# Patient Record
Sex: FEMALE | Race: WHITE | Employment: UNEMPLOYED | URBAN - METROPOLITAN AREA
[De-identification: names, ages, dates, MRNs, and addresses within clinical notes are randomized per-mention and may not be internally consistent; named-entity substitution may affect disease eponyms.]

---

## 2017-09-25 RX ORDER — ONDANSETRON 4 MG/1
TABLET, FILM COATED ORAL
Qty: 90 TABLET | Refills: 3 | Status: SHIPPED | OUTPATIENT
Start: 2017-09-25 | End: 2023-04-12 | Stop reason: ALTCHOICE

## 2018-03-20 RX ORDER — ONDANSETRON 4 MG/1
TABLET, FILM COATED ORAL
Qty: 90 TABLET | OUTPATIENT
Start: 2018-03-20

## 2018-05-02 RX ORDER — ONDANSETRON 4 MG/1
TABLET, FILM COATED ORAL
Qty: 90 TABLET | OUTPATIENT
Start: 2018-05-02

## 2018-06-01 ENCOUNTER — HOSPITAL ENCOUNTER (OUTPATIENT)
Facility: HOSPITAL | Age: 53
Discharge: LEFT BEFORE DISCHARGE | End: 2018-06-02
Attending: EMERGENCY MEDICINE | Admitting: HOSPITALIST
Payer: MEDICAID

## 2018-06-01 DIAGNOSIS — R07.9 CHEST PAIN: ICD-10-CM

## 2018-06-01 LAB
ALBUMIN SERPL BCP-MCNC: 3.8 G/DL
ALP SERPL-CCNC: 47 U/L
ALT SERPL W/O P-5'-P-CCNC: 26 U/L
ANION GAP SERPL CALC-SCNC: 9 MMOL/L
AST SERPL-CCNC: 17 U/L
BASOPHILS # BLD AUTO: 0.07 K/UL
BASOPHILS NFR BLD: 0.7 %
BILIRUB SERPL-MCNC: 0.3 MG/DL
BUN SERPL-MCNC: 21 MG/DL
CALCIUM SERPL-MCNC: 9.5 MG/DL
CHLORIDE SERPL-SCNC: 104 MMOL/L
CO2 SERPL-SCNC: 28 MMOL/L
CREAT SERPL-MCNC: 0.73 MG/DL
DIFFERENTIAL METHOD: NORMAL
EOSINOPHIL # BLD AUTO: 0.5 K/UL
EOSINOPHIL NFR BLD: 5.1 %
ERYTHROCYTE [DISTWIDTH] IN BLOOD BY AUTOMATED COUNT: 14.2 %
EST. GFR  (AFRICAN AMERICAN): >60 ML/MIN/1.73 M^2
EST. GFR  (NON AFRICAN AMERICAN): >60 ML/MIN/1.73 M^2
GLUCOSE SERPL-MCNC: 94 MG/DL
HCT VFR BLD AUTO: 44 %
HGB BLD-MCNC: 14.4 G/DL
LYMPHOCYTES # BLD AUTO: 3.7 K/UL
LYMPHOCYTES NFR BLD: 37.1 %
MCH RBC QN AUTO: 29.4 PG
MCHC RBC AUTO-ENTMCNC: 32.7 G/DL
MCV RBC AUTO: 90 FL
MONOCYTES # BLD AUTO: 0.9 K/UL
MONOCYTES NFR BLD: 9 %
NEUTROPHILS # BLD AUTO: 4.8 K/UL
NEUTROPHILS NFR BLD: 47.9 %
NT-PROBNP: 63 PG/ML
PLATELET # BLD AUTO: 207 K/UL
PMV BLD AUTO: 10.5 FL
POTASSIUM SERPL-SCNC: 4.2 MMOL/L
PROT SERPL-MCNC: 7 G/DL
RBC # BLD AUTO: 4.9 M/UL
SODIUM SERPL-SCNC: 141 MMOL/L
TROPONIN I SERPL DL<=0.01 NG/ML-MCNC: <0.012 NG/ML
WBC # BLD AUTO: 9.94 K/UL

## 2018-06-01 PROCEDURE — 94760 N-INVAS EAR/PLS OXIMETRY 1: CPT

## 2018-06-01 PROCEDURE — 63600175 PHARM REV CODE 636 W HCPCS: Performed by: EMERGENCY MEDICINE

## 2018-06-01 PROCEDURE — 93010 ELECTROCARDIOGRAM REPORT: CPT | Mod: ,,, | Performed by: INTERNAL MEDICINE

## 2018-06-01 PROCEDURE — 25500020 PHARM REV CODE 255: Performed by: EMERGENCY MEDICINE

## 2018-06-01 PROCEDURE — 25000003 PHARM REV CODE 250: Performed by: EMERGENCY MEDICINE

## 2018-06-01 PROCEDURE — 85025 COMPLETE CBC W/AUTO DIFF WBC: CPT

## 2018-06-01 PROCEDURE — 96365 THER/PROPH/DIAG IV INF INIT: CPT | Mod: 59

## 2018-06-01 PROCEDURE — 84484 ASSAY OF TROPONIN QUANT: CPT

## 2018-06-01 PROCEDURE — 80053 COMPREHEN METABOLIC PANEL: CPT

## 2018-06-01 PROCEDURE — 83880 ASSAY OF NATRIURETIC PEPTIDE: CPT

## 2018-06-01 PROCEDURE — 93005 ELECTROCARDIOGRAM TRACING: CPT

## 2018-06-01 PROCEDURE — 99285 EMERGENCY DEPT VISIT HI MDM: CPT | Mod: 25

## 2018-06-01 RX ORDER — ASPIRIN 325 MG
325 TABLET ORAL
Status: DISCONTINUED | OUTPATIENT
Start: 2018-06-01 | End: 2018-06-01

## 2018-06-01 RX ORDER — ONDANSETRON 2 MG/ML
4 INJECTION INTRAMUSCULAR; INTRAVENOUS
Status: DISCONTINUED | OUTPATIENT
Start: 2018-06-01 | End: 2018-06-01

## 2018-06-01 RX ORDER — ASPIRIN 325 MG
325 TABLET ORAL
Status: COMPLETED | OUTPATIENT
Start: 2018-06-01 | End: 2018-06-01

## 2018-06-01 RX ADMIN — IOHEXOL 100 ML: 350 INJECTION, SOLUTION INTRAVENOUS at 10:06

## 2018-06-01 RX ADMIN — PROMETHAZINE HYDROCHLORIDE 12.5 MG: 25 INJECTION INTRAMUSCULAR; INTRAVENOUS at 10:06

## 2018-06-01 RX ADMIN — ASPIRIN 325 MG ORAL TABLET 325 MG: 325 PILL ORAL at 09:06

## 2018-06-01 RX ADMIN — NITROGLYCERIN 1 INCH: 20 OINTMENT TOPICAL at 09:06

## 2018-06-01 NOTE — Clinical Note
"Date: 6/2/2018  Patient: Gabrielle Mann  Admitted: 6/1/2018  8:49 PM  Attending Provider: Reynaldo Burton MD    Gabrielle Mann or her authorized caregiver has made the decision for the patient to leave the emergency department against the advice of Dr. Violette Burton. She or her authorized caregiver has been informed and understands the inherent risks, including death.  She or her authorized caregiver has decided to accept the responsibility for this decision. Gabrielle Mann and all necessary parties have b een advised that she may return for further evaluation or treatment. Her condition at time of discharge was Stable.  Gabrielle Mann had current vital signs as follows:  BP (!) 156/65 (BP Location: Left arm, Patient Position: Lying)   Pulse (!) 58   Temp  98 °F (36.7 °C) (Oral)   Resp 19   Ht 5' 1" (1.549 m)   "

## 2018-06-02 VITALS
HEIGHT: 61 IN | OXYGEN SATURATION: 98 % | SYSTOLIC BLOOD PRESSURE: 156 MMHG | HEART RATE: 58 BPM | TEMPERATURE: 98 F | RESPIRATION RATE: 19 BRPM | DIASTOLIC BLOOD PRESSURE: 65 MMHG

## 2018-06-02 PROBLEM — R07.9 CHEST PAIN: Status: ACTIVE | Noted: 2018-06-02

## 2018-06-02 LAB — TROPONIN I SERPL DL<=0.01 NG/ML-MCNC: <0.012 NG/ML

## 2018-06-02 PROCEDURE — 84484 ASSAY OF TROPONIN QUANT: CPT

## 2018-06-02 RX ORDER — ESCITALOPRAM OXALATE 20 MG/1
20 TABLET ORAL DAILY
COMMUNITY
End: 2022-08-26

## 2018-06-02 RX ORDER — PANTOPRAZOLE SODIUM 40 MG/1
40 TABLET, DELAYED RELEASE ORAL DAILY
COMMUNITY

## 2018-06-02 RX ORDER — LISINOPRIL AND HYDROCHLOROTHIAZIDE 12.5; 2 MG/1; MG/1
1 TABLET ORAL DAILY
COMMUNITY

## 2018-06-02 RX ORDER — ASPIRIN 81 MG/1
81 TABLET ORAL DAILY
COMMUNITY

## 2018-06-02 RX ORDER — ATORVASTATIN CALCIUM 40 MG/1
40 TABLET, FILM COATED ORAL DAILY
COMMUNITY

## 2018-06-02 RX ORDER — ASPIRIN 325 MG
325 TABLET ORAL DAILY
Status: CANCELLED | OUTPATIENT
Start: 2018-06-02

## 2018-06-02 RX ORDER — METOPROLOL SUCCINATE 50 MG/1
50 TABLET, EXTENDED RELEASE ORAL DAILY
COMMUNITY

## 2018-06-02 RX ORDER — PANTOPRAZOLE SODIUM 40 MG/10ML
40 INJECTION, POWDER, LYOPHILIZED, FOR SOLUTION INTRAVENOUS DAILY
Status: CANCELLED | OUTPATIENT
Start: 2018-06-02

## 2018-06-02 RX ORDER — ONDANSETRON 2 MG/ML
4 INJECTION INTRAMUSCULAR; INTRAVENOUS EVERY 8 HOURS PRN
Status: CANCELLED | OUTPATIENT
Start: 2018-06-02

## 2018-06-02 RX ORDER — METFORMIN HYDROCHLORIDE 500 MG/1
500 TABLET ORAL 2 TIMES DAILY WITH MEALS
COMMUNITY

## 2018-06-02 NOTE — ED PROVIDER NOTES
Encounter Date: 6/1/2018       History     Chief Complaint   Patient presents with    Chest Pain     Chest pain started 10 min PTA. Midsternal Chest pain that radiates to left arm. Patient complaining of SOB and Nausea. No vomiting and diaphoresis noted.      Patient is a 53-year-old woman comes he also complained of some nausea but denies diaphoresis.  Pain is 10/10 and described as a tightness. No SOB. No trauma, no cough.           Review of patient's allergies indicates:   Allergen Reactions    Inderal [propranolol]     Morphine      Past Medical History:   Diagnosis Date    COPD (chronic obstructive pulmonary disease)     Diabetes mellitus     Emphysema lung     Hypertension     PVC (premature ventricular contraction)      Past Surgical History:   Procedure Laterality Date    APPENDECTOMY      CHOLECYSTECTOMY      COMPUTED TOMOGRAPHY ANGIOGRAM      TONSILLECTOMY       No family history on file.  Social History   Substance Use Topics    Smoking status: Current Every Day Smoker     Packs/day: 1.00     Types: Cigarettes    Smokeless tobacco: Never Used    Alcohol use No     Review of Systems   Constitutional: Negative for fever.   HENT: Negative for sore throat.    Respiratory: Positive for chest tightness. Negative for shortness of breath.    Cardiovascular: Positive for chest pain.   Gastrointestinal: Negative for abdominal pain and nausea.   Genitourinary: Negative for dysuria.   Musculoskeletal: Negative for back pain and neck stiffness.   Skin: Negative for rash.   Neurological: Negative for weakness.   Hematological: Does not bruise/bleed easily.   All other systems reviewed and are negative.      Physical Exam     Initial Vitals [06/01/18 2052]   BP Pulse Resp Temp SpO2   127/63 65 20 98.1 °F (36.7 °C) 95 %      MAP       84.33         Physical Exam    Nursing note and vitals reviewed.  Constitutional: Vital signs are normal. She appears well-developed and well-nourished. She is not  diaphoretic. No distress.   HENT:   Head: Normocephalic and atraumatic.   Eyes: Conjunctivae and EOM are normal. Pupils are equal, round, and reactive to light.   Neck: Normal range of motion. Neck supple.   Cardiovascular: Normal rate, regular rhythm and normal heart sounds.   Pulmonary/Chest: Breath sounds normal. No respiratory distress. She has no wheezes. She has no rhonchi. She has no rales.   Abdominal: Soft. She exhibits no distension. There is tenderness. There is no rebound and no guarding.   Musculoskeletal: Normal range of motion. She exhibits no edema or tenderness.   Neurological: She is alert and oriented to person, place, and time.   Skin: Skin is warm and dry.   Psychiatric: She has a normal mood and affect.         ED Course   Procedures  Labs Reviewed   COMPREHENSIVE METABOLIC PANEL - Abnormal; Notable for the following:        Result Value    BUN, Bld 21 (*)     All other components within normal limits   CBC W/ AUTO DIFFERENTIAL   TROPONIN I   NT-PRO NATRIURETIC PEPTIDE   TROPONIN I                                  Clinical Impression:   The encounter diagnosis was Chest pain.    CT Abdomen Pelvis With Contrast   Final Result      1.  No acute abnormality identified in the abdomen or pelvis.      2.  Stable appearance of the fat containing umbilical hernia.      All CT scans at this facility use dose modulation, iterative reconstruction and/or weight based dosing when appropriate to reduce radiation dose to as low as reasonably achievable.         Electronically signed by: Valentin Irizarry MD   Date:    06/01/2018   Time:    23:13      X-Ray Chest AP Portable   Final Result      1. The lungs are clear.   2. There are several oval-shaped calcifications projected lateral to the right humeral head. One of the larger ones measures 5 mm.  This is characteristic of calcific tendinitis.   .         Electronically signed by: Uriel Holliday MD   Date:    06/01/2018   Time:    21:32        Labs Reviewed    COMPREHENSIVE METABOLIC PANEL - Abnormal; Notable for the following:        Result Value    BUN, Bld 21 (*)     All other components within normal limits   CBC W/ AUTO DIFFERENTIAL   TROPONIN I   NT-PRO NATRIURETIC PEPTIDE   TROPONIN I     Imaging Results          CT Abdomen Pelvis With Contrast (Final result)  Result time 06/01/18 23:13:35    Final result by Valentin Irizarry MD (06/01/18 23:13:35)                 Impression:      1.  No acute abnormality identified in the abdomen or pelvis.    2.  Stable appearance of the fat containing umbilical hernia.    All CT scans at this facility use dose modulation, iterative reconstruction and/or weight based dosing when appropriate to reduce radiation dose to as low as reasonably achievable.      Electronically signed by: Valentin Irizarry MD  Date:    06/01/2018  Time:    23:13             Narrative:    EXAMINATION:  CT ABDOMEN PELVIS WITH CONTRAST    CLINICAL HISTORY:  Abdominal pain, unspecified;    TECHNIQUE:  Axial CT imaging was performed through the abdomen and pelvis with  75cc  of intravenous contrast. Multiplanar reformats were performed and interpreted.    COMPARISON:  CT abdomen and pelvis, 03/05/2017.    FINDINGS:  Lung bases are clear.    The liver, spleen, kidneys, adrenal glands, and pancreas are normal.    Cholecystectomy clips are noted.  There is a umbilical hernia containing only fat.  No herniated bowel loops.    No free fluid, free air, or inflammatory change.    Normal appendix.  No bowel obstruction.  There is sigmoid colon diverticulosis but no evidence for diverticulitis.    The abdominal aorta is normal in caliber.    The urinary bladder is unremarkable. BTL clips are noted.  The uterus and adnexa are unremarkable.    No significant osseous abnormality is identified.                               X-Ray Chest AP Portable (Final result)  Result time 06/01/18 21:32:44    Final result by PRIYA Holliday Sr., MD (06/01/18 21:32:44)                  Impression:      1. The lungs are clear.  2. There are several oval-shaped calcifications projected lateral to the right humeral head. One of the larger ones measures 5 mm.  This is characteristic of calcific tendinitis.  .      Electronically signed by: Uriel Holliday MD  Date:    06/01/2018  Time:    21:32             Narrative:    EXAMINATION:  XR CHEST AP PORTABLE    CLINICAL HISTORY:  Chest Pain;    COMPARISON:  None    FINDINGS:  The size of the heart is normal. The lungs are clear. There is no pneumothorax.  The costophrenic angles are sharp.  There are several oval-shaped calcifications projected lateral to the right humeral head.  One of the larger ones measures 5 mm.                                Disposition:   Disposition: Placed in Observation  Condition: Stable                        Reynaldo Burton MD  06/02/18 0029

## 2018-06-02 NOTE — DISCHARGE INSTRUCTIONS
Although you are leaving against medical advice please return for any concerns or if your wish to complete your workup. Please follow up with your cardiologist as soon as possible

## 2018-06-02 NOTE — ED NOTES
Patient resting quietly with eyes closed in bed with no complaints of chest pain. Even chest rise and fall noted.

## 2023-02-28 ENCOUNTER — HOSPITAL ENCOUNTER (OUTPATIENT)
Dept: RADIOLOGY | Facility: HOSPITAL | Age: 58
Discharge: HOME | End: 2023-02-28
Attending: STUDENT IN AN ORGANIZED HEALTH CARE EDUCATION/TRAINING PROGRAM
Payer: MEDICAID

## 2023-02-28 ENCOUNTER — OFFICE VISIT (OUTPATIENT)
Dept: FAMILY MEDICINE | Facility: HOSPITAL | Age: 58
End: 2023-02-28
Payer: MEDICAID

## 2023-02-28 VITALS
WEIGHT: 254.88 LBS | BODY MASS INDEX: 48.12 KG/M2 | HEART RATE: 63 BPM | SYSTOLIC BLOOD PRESSURE: 133 MMHG | DIASTOLIC BLOOD PRESSURE: 78 MMHG | HEIGHT: 61 IN

## 2023-02-28 DIAGNOSIS — N95.2 ATROPHIC VAGINITIS: ICD-10-CM

## 2023-02-28 DIAGNOSIS — M25.50 ARTHRALGIA, UNSPECIFIED JOINT: ICD-10-CM

## 2023-02-28 DIAGNOSIS — N95.0 POST-MENOPAUSAL BLEEDING: Primary | ICD-10-CM

## 2023-02-28 DIAGNOSIS — Z72.0 TOBACCO USE: ICD-10-CM

## 2023-02-28 DIAGNOSIS — E11.9 TYPE 2 DIABETES MELLITUS WITHOUT COMPLICATION, WITHOUT LONG-TERM CURRENT USE OF INSULIN: ICD-10-CM

## 2023-02-28 PROBLEM — R07.9 CHEST PAIN: Status: RESOLVED | Noted: 2018-06-02 | Resolved: 2023-02-28

## 2023-02-28 PROBLEM — E78.5 DYSLIPIDEMIA: Status: ACTIVE | Noted: 2023-02-14

## 2023-02-28 PROBLEM — R11.15 NON-INTRACTABLE CYCLICAL VOMITING WITH NAUSEA: Status: ACTIVE | Noted: 2018-08-28

## 2023-02-28 PROBLEM — G89.29 CHRONIC PAIN: Status: ACTIVE | Noted: 2020-05-19

## 2023-02-28 PROBLEM — F17.210 CIGARETTE SMOKER: Status: RESOLVED | Noted: 2017-07-28 | Resolved: 2023-02-28

## 2023-02-28 PROBLEM — R11.15 NON-INTRACTABLE CYCLICAL VOMITING WITH NAUSEA: Status: RESOLVED | Noted: 2018-08-28 | Resolved: 2023-02-28

## 2023-02-28 PROBLEM — F17.210 CIGARETTE SMOKER: Status: ACTIVE | Noted: 2017-07-28

## 2023-02-28 PROBLEM — G56.93 NEUROPATHY OF BOTH UPPER EXTREMITIES: Status: ACTIVE | Noted: 2020-05-19

## 2023-02-28 PROCEDURE — 73502 X-RAY EXAM HIP UNI 2-3 VIEWS: CPT | Mod: TC,FY,RT

## 2023-02-28 PROCEDURE — 73562 X-RAY EXAM OF KNEE 3: CPT | Mod: 26,50,, | Performed by: RADIOLOGY

## 2023-02-28 PROCEDURE — 73502 XR HIP WITH PELVIS WHEN PERFORMED, 2 OR 3  VIEWS RIGHT: ICD-10-PCS | Mod: 26,RT,, | Performed by: RADIOLOGY

## 2023-02-28 PROCEDURE — 73562 XR KNEE 3 VIEW BILATERAL: ICD-10-PCS | Mod: 26,50,, | Performed by: RADIOLOGY

## 2023-02-28 PROCEDURE — 73502 X-RAY EXAM HIP UNI 2-3 VIEWS: CPT | Mod: 26,RT,, | Performed by: RADIOLOGY

## 2023-02-28 PROCEDURE — 73562 X-RAY EXAM OF KNEE 3: CPT | Mod: TC,50,FY

## 2023-02-28 PROCEDURE — 99215 OFFICE O/P EST HI 40 MIN: CPT | Performed by: STUDENT IN AN ORGANIZED HEALTH CARE EDUCATION/TRAINING PROGRAM

## 2023-02-28 RX ORDER — TRAMADOL HYDROCHLORIDE 50 MG/1
1 TABLET ORAL
COMMUNITY
End: 2023-04-12

## 2023-02-28 RX ORDER — ALBUTEROL SULFATE 90 UG/1
AEROSOL, METERED RESPIRATORY (INHALATION)
COMMUNITY
Start: 2023-02-13

## 2023-02-28 RX ORDER — PREGABALIN 75 MG/1
75 CAPSULE ORAL 2 TIMES DAILY
Qty: 60 CAPSULE | Refills: 3 | Status: SHIPPED | OUTPATIENT
Start: 2023-02-28 | End: 2023-04-12 | Stop reason: ALTCHOICE

## 2023-02-28 RX ORDER — ACETAMINOPHEN 500 MG
2 TABLET ORAL
COMMUNITY

## 2023-02-28 RX ORDER — METOPROLOL TARTRATE 50 MG/1
TABLET ORAL
COMMUNITY
Start: 2023-01-24

## 2023-02-28 RX ORDER — BUDESONIDE AND FORMOTEROL FUMARATE DIHYDRATE 160; 4.5 UG/1; UG/1
AEROSOL RESPIRATORY (INHALATION)
COMMUNITY
Start: 2022-10-06

## 2023-02-28 RX ORDER — PRAVASTATIN SODIUM 20 MG/1
1 TABLET ORAL
COMMUNITY

## 2023-02-28 NOTE — PROGRESS NOTES
History & Physical  [facility]      SUBJECTIVE:     History of Present Illness:  Patient is a 57 y.o. female presents to clinic to establish care. PMH HTN, HLD, T2DM, COPD, pseudogout.     Post-menopausal bleeding: Pt has had increased urinary frequency and pelvic cramping with some blood she believes was vaginal over the last 2 days. Reports feeling like she is having a period but not having the bleeding associated. LMP 4 years ago, last pap at that time was normal.     Joint paint: Pt with history of pseudogout in left wrist. Over the last year has had more diffuse joint pain and stiffness in the shoulders, hips and knees. Unsure if this is pseudogout spreading or other etiology. Family history of brother with diabetes insipidus in childhood. Pain somewhat improves with movement but struggles to move due to pain and stiffness. Celexa hasn't helped. Previously did not tolerate gabapentin.     Last mamogram- within the last year  Immunizations UTD  Last colonoscopy- 3-4 years ago and negative    Smoker- Quit smoking cigarettes in . Currently vaping and working on [employer] back. Has noticed weight gain since stopping cigarettes.       Review of patient's allergies indicates:   Allergen Reactions    Inderal [propranolol]     Morphine        Past Medical History:   Diagnosis Date    COPD (chronic obstructive pulmonary disease)     Diabetes mellitus     Emphysema lung     Hypertension     PVC (premature ventricular contraction)      Past Surgical History:   Procedure Laterality Date    APPENDECTOMY      CHOLECYSTECTOMY      COMPUTED TOMOGRAPHY ANGIOGRAM      TONSILLECTOMY       No family history on file.  Social History     Tobacco Use    Smoking status: Every Day     Packs/day: 0.00     Years: 41.00     Pack years: 0.00     Types: Vaping with nicotine, Cigarettes     Start date:      Last attempt to quit: 2022     Years since quittin.3    Smokeless tobacco: Never    Tobacco  "comments:     Trying to quit now vaping    Substance Use Topics    Alcohol use: No    Drug use: Yes     Types: Marijuana        OBJECTIVE:     Vital Signs (Most Recent)  Vitals:    02/28/23 0852   BP: 133/78   Pulse: 63   Weight: 115.6 kg (254 lb 13.6 oz)   Height: 5' 1" (1.549 m)     BMI: 48.51    Physical Exam:  Gen: Well nourished and developed, NAD, appears stated age  Neck: trachea midline, no LAD, no thyromegaly  CV: RRR, S1 and S2 present, no murmurs, no LE edema  Resp: breathing comfortably on room air, CTAB, no wheezes or crackles  Abd: Non-distended, BSx4, non-tender  Skin: No rashes or abnormal skin lesions, no apparent jaundice or bruising  Neuro: A&Ox4, sensation intact throughout, spontaneous movements, gait smooth yony wnl  Psych: Logical thought process, answers questions appropriately    GYN: External genitalia wnl, no apparent masses or lesions, bimanual examination performed and tolerated by the patient, no cervical motion tenderness. Speculum examination performed and tolerated by the patient with some discomfort. Cervical os closed and without obvious lesions. No abnormal vaginal discharge or bleeding appreciated. Significant vaginal mucosa atrophy. Pap test obtained, pt tolerated procedure well. STI vaginal swabs obtained, pt tolerated procedure well.     Pelvic examination performed by Julian Kemp DO in the presence of a chaperone.     POCT UA: wnl    ASSESSMENT/PLAN:   57 y.o.female presents to clinic to establish care.     1. Post-menopausal bleeding  Atrophic vaginitis likely source of urinary symptoms and vaginal discomfort. No signs of bleeding from urethra, vagina or rectum at this time. Need to continue work-up of post-menopausal bleeding and assess need for further work-up with EMB pending results. Pap and STI screening obtained. Pt verbalized understanding and was in agreement with the plan.    - TSH; Future  - CBC Auto Differential; Future  - Follicle Stimulating Hormone; " Future  - US Transvaginal Non OB; Future  - Prolactin; Future  - Sedimentation rate; Future  - C-reactive protein; Future  - TAL; Future    2. Arthralgia, unspecified joint  Arthritis worsened with recent weight gain vs autoimmune process. Will evaluate further. Will trial lyrica for pain. Medication, side effects and proper use discussed. Pt verbalized understanding and was in agreement with the plan.    - CBC Auto Differential; Future  - Comprehensive Metabolic Panel; Future  - Rheumatoid factor; Future  - Cyclic citrul peptide antibody, IgG; Future  - Sjogrens syndrome-A extractable nuclear antibody; Future  - Hepatitis C antibody; Future  - pregabalin (LYRICA) 75 MG capsule; Take 1 capsule (75 mg total) by mouth 2 (two) times daily.  Dispense: 60 capsule; Refill: 3  - X-Ray Knee 3 View Bilateral; Future  - X-Ray Hip 2 or 3 views Right (with Pelvis when performed); Future    3. Type 2 diabetes mellitus without complication, without long-term current use of insulin  Due for HgbA1C screening. Pending results will plan to start ozempic for weight loss and glycemic control. Medication, side effects and proper use discussed. Pt verbalized understanding and was in agreement with the plan.    - Hemoglobin A1C; Future  - pregabalin (LYRICA) 75 MG capsule; Take 1 capsule (75 mg total) by mouth 2 (two) times daily.  Dispense: 60 capsule; Refill: 3    4. Atrophic vaginitis  As above. Medication, side effects and proper use discussed. Pt verbalized understanding and was in agreement with the plan.    - conjugated estrogens (PREMARIN) vaginal cream; Place 0.5 g vaginally twice a week.  Dispense: 30 g; Refill: 3    5. Tobacco use  Vaping cessation discussed. Pt currently working on cutting back.     Follow-up: 1 month for lyrica dosing and further evaluation of post-menopausal bleeding    A total of 45 minutes was spent on patient care during this encounter which included chart review, examining the patient, formulating a  treatment plan and documentation.     Complex medical decision making performed due to multiple medical problems addressed during the visit.     Julian Kemp DO  [facility], PGY-3

## 2023-03-06 ENCOUNTER — TELEPHONE (OUTPATIENT)
Dept: FAMILY MEDICINE | Facility: HOSPITAL | Age: 58
End: 2023-03-06
Payer: MEDICAID

## 2023-03-06 DIAGNOSIS — M11.89 PSEUDOGOUT INVOLVING MULTIPLE JOINTS: Primary | ICD-10-CM

## 2023-03-06 DIAGNOSIS — E11.9 TYPE 2 DIABETES MELLITUS WITHOUT COMPLICATION, WITHOUT LONG-TERM CURRENT USE OF INSULIN: ICD-10-CM

## 2023-03-06 RX ORDER — SEMAGLUTIDE 1.34 MG/ML
0.25 INJECTION, SOLUTION SUBCUTANEOUS
Qty: 1 PEN | Refills: 5 | Status: SHIPPED | OUTPATIENT
Start: 2023-03-06 | End: 2023-04-12 | Stop reason: ALTCHOICE

## 2023-03-06 RX ORDER — COLCHICINE 0.6 MG/1
0.6 TABLET ORAL 2 TIMES DAILY
Qty: 60 TABLET | Refills: 11 | Status: SHIPPED | OUTPATIENT
Start: 2023-03-06 | End: 2024-03-05

## 2023-03-06 NOTE — TELEPHONE ENCOUNTER
Called to discuss lab and pap smear results.     Pap smear: nilm, HPV negative, negative for BV/Trich/yeast/GCCT    Autoimmune work-up with only positive of TAL 1:160 homogenous. All other tests negative. This is consistent with pseudogout. X-ray hip and pelvis with hydroxyapatite deposition. This can be related to pseudogout or cancerous process given overall blood work not consistent with other hypercalcemia states. Need to continue work-up for post-menopausal bleeding but if unremarkable all symptoms likely related to pseudogout. Will refer to rheumatology given number of joints involved and start colchicine BID. Medication, side effects and proper use discussed. Pt verbalized understanding and was in agreement with the plan.      Pt has T2DM and is interested in weight loss. Will start ozempic. Medication, side effects and proper use discussed. Pt verbalized understanding and was in agreement with the plan.      Julian Kemp,   \[facility]\"" Family Medicine, PGY-3

## 2023-03-31 ENCOUNTER — PATIENT MESSAGE (OUTPATIENT)
Dept: FAMILY MEDICINE | Facility: HOSPITAL | Age: 58
End: 2023-03-31
Payer: MEDICAID

## 2023-04-03 ENCOUNTER — PATIENT MESSAGE (OUTPATIENT)
Dept: FAMILY MEDICINE | Facility: HOSPITAL | Age: 58
End: 2023-04-03
Payer: MEDICAID

## 2023-04-04 ENCOUNTER — TELEPHONE (OUTPATIENT)
Dept: FAMILY MEDICINE | Facility: HOSPITAL | Age: 58
End: 2023-04-04
Payer: MEDICAID

## 2023-04-12 ENCOUNTER — OFFICE VISIT (OUTPATIENT)
Dept: FAMILY MEDICINE | Facility: HOSPITAL | Age: 58
End: 2023-04-12
Payer: MEDICAID

## 2023-04-12 VITALS
SYSTOLIC BLOOD PRESSURE: 151 MMHG | HEART RATE: 64 BPM | HEIGHT: 61 IN | BODY MASS INDEX: 48.12 KG/M2 | DIASTOLIC BLOOD PRESSURE: 73 MMHG | WEIGHT: 254.88 LBS

## 2023-04-12 DIAGNOSIS — M25.50 ARTHRALGIA, UNSPECIFIED JOINT: ICD-10-CM

## 2023-04-12 DIAGNOSIS — N95.0 POST-MENOPAUSAL BLEEDING: ICD-10-CM

## 2023-04-12 DIAGNOSIS — E11.9 TYPE 2 DIABETES MELLITUS WITHOUT COMPLICATION, WITHOUT LONG-TERM CURRENT USE OF INSULIN: Primary | ICD-10-CM

## 2023-04-12 DIAGNOSIS — K21.9 GASTROESOPHAGEAL REFLUX DISEASE WITHOUT ESOPHAGITIS: ICD-10-CM

## 2023-04-12 PROCEDURE — 99215 OFFICE O/P EST HI 40 MIN: CPT | Performed by: STUDENT IN AN ORGANIZED HEALTH CARE EDUCATION/TRAINING PROGRAM

## 2023-04-12 RX ORDER — ONDANSETRON 4 MG/1
4 TABLET, ORALLY DISINTEGRATING ORAL EVERY 8 HOURS PRN
Qty: 20 TABLET | Refills: 3 | Status: SHIPPED | OUTPATIENT
Start: 2023-04-12

## 2023-04-12 RX ORDER — SEMAGLUTIDE 1.34 MG/ML
1 INJECTION, SOLUTION SUBCUTANEOUS
Qty: 9 ML | Refills: 3 | Status: SHIPPED | OUTPATIENT
Start: 2023-04-12 | End: 2023-06-12 | Stop reason: ALTCHOICE

## 2023-04-12 RX ORDER — COVID-19 ANTIGEN TEST
KIT MISCELLANEOUS
COMMUNITY
Start: 2023-03-21

## 2023-04-12 RX ORDER — PREGABALIN 150 MG/1
150 CAPSULE ORAL 2 TIMES DAILY
Qty: 60 CAPSULE | Refills: 6 | Status: SHIPPED | OUTPATIENT
Start: 2023-04-12 | End: 2023-08-21 | Stop reason: SDUPTHER

## 2023-04-12 NOTE — PROGRESS NOTES
Progress Note  [facility]    Subjective:     Gabrielle Mann is a 58 y.o. year old female who presents to clinic for follow-up.     Pseudogout: Insurance will only cover 3 days of colchicine at a time. Has been helping joint pain. Seeing rheumatology tomorrow. Lyrica has helped some. Tolerating well.     Postmenopausal bleeding: Has not gotten US done due to pain from pap smear. Has not had recurrence in bleeding since that 1 time. Has started vaginal estrogen cream with some improvement.     T2DM: Doing well on ozempic. Has not gained or lost weight.     Patient Active Problem List    Diagnosis Date Noted    Atrophic vaginitis 2023    Post-menopausal bleeding 2023    Dyslipidemia 2023    Chronic pain 2020    Neuropathy of both upper extremities 2020    Lumbar pain with radiation down left leg 2016    Type 2 diabetes mellitus 2014    Urinary, incontinence, stress female 2014    Chronic obstructive pulmonary disease 2013    Degeneration of intervertebral disc of lumbar region 2013    Mixed anxiety depressive disorder 2013    Hypertension 2013        Review of patient's allergies indicates:   Allergen Reactions    Inderal [propranolol]     Morphine         Past Medical History:   Diagnosis Date    COPD (chronic obstructive pulmonary disease)     Diabetes mellitus     Emphysema lung     Hypertension     PVC (premature ventricular contraction)       Past Surgical History:   Procedure Laterality Date    APPENDECTOMY      CHOLECYSTECTOMY      COMPUTED TOMOGRAPHY ANGIOGRAM      TONSILLECTOMY        No family history on file.   Social History     Tobacco Use    Smoking status: Every Day     Packs/day: 0.00     Years: 41.00     Pack years: 0.00     Types: Vaping with nicotine, Cigarettes     Start date:      Last attempt to quit: 2022     Years since quittin.4    Smokeless tobacco: Never    Tobacco comments:     Trying to quit now  "vaping    Substance Use Topics    Alcohol use: No        Objective:     Vitals:    04/12/23 0931   BP: (!) 151/73   Pulse: 64   Weight: 115.6 kg (254 lb 13.6 oz)   Height: 5' 1" (1.549 m)     BMI: 48.15    Gen: No apparent distress, well nourished and developed, appears stated age  CV: RRR, S1 and S2 present, no LE edema  Resp: CTAB, normal respiratory effort      Assessment/Plan:     Gabrielle Mann is a 58 y.o. year old female who presents to clinic for follow-up.     1. Arthralgia, unspecified joint  Will increase lyrica today for better control. Medication, side effects and proper use discussed. Pt verbalized understanding and was in agreement with the plan.    - pregabalin (LYRICA) 150 MG capsule; Take 1 capsule (150 mg total) by mouth 2 (two) times daily.  Dispense: 60 capsule; Refill: 6    2. Type 2 diabetes mellitus without complication, without long-term current use of insulin  Doing well on ozempic. Will increase dose to more therapeutic dose. Medication, side effects and proper use discussed. Pt verbalized understanding and was in agreement with the plan.    - pregabalin (LYRICA) 150 MG capsule; Take 1 capsule (150 mg total) by mouth 2 (two) times daily.  Dispense: 60 capsule; Refill: 6  - semaglutide (OZEMPIC) 1 mg/dose (4 mg/3 mL); Inject 1 mg into the skin every 7 days.  Dispense: 9 mL; Refill: 3    3. Gastroesophageal reflux disease without esophagitis  Well controlled on current regimen.    - ondansetron (ZOFRAN-ODT) 4 MG TbDL; Take 1 tablet (4 mg total) by mouth every 8 (eight) hours as needed (Nausea).  Dispense: 20 tablet; Refill: 3    4. BMI 45.0-49.9, adult  Doing well on ozempic.     5. Post-menopausal bleeding  Discussed need to get US done. If unable to tolerate US can get MRI but need to try US first. Pt will likely need EMB. Given discomfort with pap last visit will plan to refer to OB/Gyn once US completed for further evaluation and treatment. Pt verbalized understanding and was in agreement " with the plan.      Follow-up: 2 months    A total of 35 minutes was spent on patient care during this encounter which included chart review, examining the patient, formulating a treatment plan and documentation.     Medical decision making straight forward and not complex during this visit.     Case discussed with staff: Dr. Valencia Kemp, DO  [facility], PGY-3

## 2023-04-13 NOTE — PROGRESS NOTES
I assume primary medical responsibility for this patient. I have reviewed the history, physical, and assessement & treatment plan with the resident and agree that the care is reasonable and necessary. This service has been performed by a resident with the presence of a teaching physician under the primary care exception. If necessary, an addendum of additional findings or evaluation beyond the resident documentation will be noted below.    -Aware reviewed. No irregularities noted.        Garima Birmingham MD    [facility]

## 2023-04-30 ENCOUNTER — PATIENT MESSAGE (OUTPATIENT)
Dept: FAMILY MEDICINE | Facility: HOSPITAL | Age: 58
End: 2023-04-30
Payer: MEDICAID

## 2023-05-01 ENCOUNTER — PATIENT MESSAGE (OUTPATIENT)
Dept: FAMILY MEDICINE | Facility: HOSPITAL | Age: 58
End: 2023-05-01
Payer: MEDICAID

## 2023-05-01 DIAGNOSIS — N95.0 POST-MENOPAUSAL BLEEDING: Primary | ICD-10-CM

## 2023-06-12 ENCOUNTER — OFFICE VISIT (OUTPATIENT)
Dept: FAMILY MEDICINE | Facility: HOSPITAL | Age: 58
End: 2023-06-12
Payer: MEDICAID

## 2023-06-12 VITALS — WEIGHT: 245.56 LBS | BODY MASS INDEX: 46.36 KG/M2 | HEIGHT: 61 IN

## 2023-06-12 DIAGNOSIS — F43.21 ADJUSTMENT DISORDER WITH DEPRESSED MOOD: ICD-10-CM

## 2023-06-12 DIAGNOSIS — N95.0 POST-MENOPAUSAL BLEEDING: ICD-10-CM

## 2023-06-12 DIAGNOSIS — E11.9 TYPE 2 DIABETES MELLITUS WITHOUT COMPLICATION, WITHOUT LONG-TERM CURRENT USE OF INSULIN: Primary | ICD-10-CM

## 2023-06-12 PROCEDURE — 99214 OFFICE O/P EST MOD 30 MIN: CPT | Performed by: STUDENT IN AN ORGANIZED HEALTH CARE EDUCATION/TRAINING PROGRAM

## 2023-06-12 RX ORDER — DULAGLUTIDE 0.75 MG/.5ML
0.75 INJECTION, SOLUTION SUBCUTANEOUS
Qty: 4 PEN | Refills: 11 | Status: SHIPPED | OUTPATIENT
Start: 2023-06-12 | End: 2024-06-11

## 2023-06-12 RX ORDER — PREGABALIN 75 MG/1
75 CAPSULE ORAL 2 TIMES DAILY
COMMUNITY
Start: 2023-04-24 | End: 2023-08-21 | Stop reason: DRUGHIGH
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
## 2023-06-14 NOTE — PROGRESS NOTES
I assume primary medical responsibility for this patient. I have reviewed the history, physical, and assessement & treatment plan with the resident and agree that the care is reasonable and necessary. This service has been performed by a resident without the presence of a teaching physician under the primary care exception. If necessary, an addendum of additional findings or evaluation beyond the resident documentation will be noted below.        Garima Birmingham MD    [facility]

## 2023-08-21 DIAGNOSIS — G56.93 NEUROPATHY OF BOTH UPPER EXTREMITIES: ICD-10-CM

## 2023-08-21 DIAGNOSIS — G56.93 NEUROPATHY OF BOTH UPPER EXTREMITIES: Primary | ICD-10-CM

## 2023-08-21 DIAGNOSIS — E11.9 TYPE 2 DIABETES MELLITUS WITHOUT COMPLICATION, WITHOUT LONG-TERM CURRENT USE OF INSULIN: ICD-10-CM

## 2023-08-21 DIAGNOSIS — M25.50 ARTHRALGIA, UNSPECIFIED JOINT: ICD-10-CM

## 2023-08-21 RX ORDER — PREGABALIN 150 MG/1
150 CAPSULE ORAL 2 TIMES DAILY
Qty: 60 CAPSULE | Refills: 11 | Status: SHIPPED | OUTPATIENT
Start: 2023-08-21 | End: 2024-08-20

## 2023-08-21 RX ORDER — PREGABALIN 150 MG/1
150 CAPSULE ORAL 2 TIMES DAILY
Qty: 60 CAPSULE | Refills: 11 | Status: SHIPPED | OUTPATIENT
Start: 2023-08-21 | End: 2023-08-21 | Stop reason: SDUPTHER